# Patient Record
Sex: FEMALE | Race: BLACK OR AFRICAN AMERICAN | NOT HISPANIC OR LATINO | Employment: UNEMPLOYED | URBAN - METROPOLITAN AREA
[De-identification: names, ages, dates, MRNs, and addresses within clinical notes are randomized per-mention and may not be internally consistent; named-entity substitution may affect disease eponyms.]

---

## 2019-08-02 ENCOUNTER — HOSPITAL ENCOUNTER (EMERGENCY)
Facility: HOSPITAL | Age: 49
Discharge: HOME | End: 2019-08-02
Attending: EMERGENCY MEDICINE
Payer: MEDICAID

## 2019-08-02 VITALS
HEART RATE: 95 BPM | SYSTOLIC BLOOD PRESSURE: 147 MMHG | BODY MASS INDEX: 36.82 KG/M2 | RESPIRATION RATE: 18 BRPM | TEMPERATURE: 98 F | DIASTOLIC BLOOD PRESSURE: 87 MMHG | OXYGEN SATURATION: 100 % | WEIGHT: 195 LBS | HEIGHT: 61 IN
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
## 2019-08-02 DIAGNOSIS — T19.2XXA RETAINED TAMPON, INITIAL ENCOUNTER: Primary | ICD-10-CM

## 2019-08-02 DIAGNOSIS — I10 HYPERTENSION, UNSPECIFIED TYPE: ICD-10-CM

## 2019-08-02 PROCEDURE — 99282 EMERGENCY DEPT VISIT SF MDM: CPT

## 2019-08-03 NOTE — ED PROVIDER NOTES
Encounter Date: 8/2/2019       History     Chief Complaint   Patient presents with    Female  Problem     48y F ambulatory to ED with c/o tampon stuck in vagina, possibly for a week. c/o foul odor       Female  Problem   Primary symptoms include discharge, genital odor.    Primary symptoms include no pelvic pain, no fever.   This is a new problem. Illness onset: 1 week. The problem occurs constantly. The problem has been gradually worsening. Context: retained tampon x 1 week. Her LMP was days ago. The discharge was malodorous and dark. Pertinent negatives include no abdominal pain and no vomiting.     Review of patient's allergies indicates:   Allergen Reactions    Sulfa (sulfonamide antibiotics) Blisters     No past medical history on file.  No past surgical history on file.  No family history on file.  Social History     Tobacco Use    Smoking status: Not on file   Substance Use Topics    Alcohol use: Not on file    Drug use: Not on file     Review of Systems   Constitutional: Negative for chills, fatigue and fever.   Gastrointestinal: Negative for abdominal pain and vomiting.   Genitourinary: Positive for vaginal discharge. Negative for pelvic pain.   All other systems reviewed and are negative.      Physical Exam     Initial Vitals [08/02/19 2059]   BP Pulse Resp Temp SpO2   (!) 156/87 105 16 98.5 °F (36.9 °C) 100 %      MAP       --         Physical Exam    Nursing note and vitals reviewed.  Constitutional: She appears well-developed and well-nourished. She is not diaphoretic. No distress.   HENT:   Head: Normocephalic and atraumatic.   Eyes: Conjunctivae and EOM are normal.   Neck: Normal range of motion. Neck supple.   Cardiovascular: Normal rate, normal heart sounds and intact distal pulses.   Pulmonary/Chest: Breath sounds normal. No respiratory distress.   Abdominal: Soft. She exhibits no distension. There is no tenderness. There is no rebound and no guarding.   Genitourinary: Cervix exhibits no  friability. Right adnexum displays no tenderness. Left adnexum displays no tenderness. No erythema, tenderness or bleeding in the vagina.   There is a foreign body in the vagina. Vaginal discharge found.   Musculoskeletal: Normal range of motion. She exhibits no edema or tenderness.   Neurological: She is alert and oriented to person, place, and time. She has normal strength.   Skin: Skin is warm and dry. Capillary refill takes less than 2 seconds.   Psychiatric: She has a normal mood and affect.         ED Course   Foreign Body  Date/Time: 8/2/2019 10:24 PM  Performed by: Guy J. Lefort, MD  Authorized by: Guy J. Lefort, MD   Body area: vagina  Anesthesia method: none.  Localization method: speculum  Removal mechanism: forceps  Complexity: simple  1 objects recovered.  Objects recovered: tampon  Post-procedure assessment: foreign body removed (removed whole, no retained fragments identified)  Patient tolerance: Patient tolerated the procedure well with no immediate complications      Labs Reviewed - No data to display       Imaging Results    None          Medical Decision Making:   Differential Diagnosis:   Retained tampon PID toxic shock syndrome  ED Management:  Foreign body removed in its entirety.  Discussed remote possibility of retained particles which can cause increased pain discharge or lead to toxic shock syndrome.  Discussed appropriate use tampons including removal after 8 hr.  Also do not suspect toxicity at this time.  Patient is well-appearing with a benign abdomen stable vitals.  Hypertension noted, however asymptomatic and needs close PCP follow-up.  Discussed indications to ED return.                      Clinical Impression:       ICD-10-CM ICD-9-CM   1. Retained tampon, initial encounter T19.2XXA 939.2   2. Hypertension, unspecified type I10 401.9         Disposition:   Disposition: Discharged  Condition: Stable                        Guy J. Lefort, MD  08/02/19 4737

## 2022-02-01 ENCOUNTER — IMMUNIZATION (OUTPATIENT)
Dept: PRIMARY CARE CLINIC | Facility: CLINIC | Age: 52
End: 2022-02-01
Payer: MEDICAID

## 2022-02-01 DIAGNOSIS — Z23 NEED FOR VACCINATION: Primary | ICD-10-CM

## 2022-02-01 PROCEDURE — 91300 COVID-19, MRNA, LNP-S, PF, 30 MCG/0.3 ML DOSE VACCINE: CPT | Mod: PBBFAC | Performed by: INTERNAL MEDICINE

## 2022-02-01 PROCEDURE — 0001A COVID-19, MRNA, LNP-S, PF, 30 MCG/0.3 ML DOSE VACCINE: CPT | Mod: CV19,PBBFAC | Performed by: INTERNAL MEDICINE
